# Patient Record
Sex: MALE | Race: BLACK OR AFRICAN AMERICAN | Employment: UNEMPLOYED
[De-identification: names, ages, dates, MRNs, and addresses within clinical notes are randomized per-mention and may not be internally consistent; named-entity substitution may affect disease eponyms.]

---

## 2024-03-18 ENCOUNTER — HOSPITAL ENCOUNTER (EMERGENCY)
Facility: HOSPITAL | Age: 24
Discharge: HOME | End: 2024-03-18
Payer: COMMERCIAL

## 2024-03-18 VITALS
OXYGEN SATURATION: 99 % | WEIGHT: 162 LBS | RESPIRATION RATE: 16 BRPM | TEMPERATURE: 97.8 F | SYSTOLIC BLOOD PRESSURE: 126 MMHG | HEART RATE: 100 BPM | HEIGHT: 74 IN | DIASTOLIC BLOOD PRESSURE: 78 MMHG | BODY MASS INDEX: 20.79 KG/M2

## 2024-03-18 DIAGNOSIS — Z20.2 POSSIBLE EXPOSURE TO STD: ICD-10-CM

## 2024-03-18 DIAGNOSIS — J02.9 ACUTE PHARYNGITIS, UNSPECIFIED ETIOLOGY: Primary | ICD-10-CM

## 2024-03-18 LAB
APPEARANCE UR: CLEAR
BACTERIA URNS QL MICRO: ABNORMAL /HPF
BILIRUB UR QL: NEGATIVE
COLOR UR: ABNORMAL
DEPRECATED S PYO AG THROAT QL EIA: NEGATIVE
EPITH CASTS URNS QL MICRO: ABNORMAL /LPF (ref 0–5)
FLUAV RNA SPEC QL NAA+PROBE: NOT DETECTED
FLUBV RNA SPEC QL NAA+PROBE: NOT DETECTED
GLUCOSE UR STRIP.AUTO-MCNC: NEGATIVE MG/DL
HGB UR QL STRIP: ABNORMAL
KETONES UR QL STRIP.AUTO: 80 MG/DL
LEUKOCYTE ESTERASE UR QL STRIP.AUTO: ABNORMAL
MUCOUS THREADS URNS QL MICRO: ABNORMAL /LPF
NITRITE UR QL STRIP.AUTO: NEGATIVE
PH UR STRIP: 6 (ref 5–8)
PROT UR STRIP-MCNC: 30 MG/DL
RBC #/AREA URNS HPF: ABNORMAL /HPF (ref 0–5)
SARS-COV-2 RNA RESP QL NAA+PROBE: NOT DETECTED
SP GR UR REFRACTOMETRY: 1.02 (ref 1–1.03)
UROBILINOGEN UR QL STRIP.AUTO: 1 EU/DL (ref 0.2–1)
WBC URNS QL MICRO: ABNORMAL /HPF (ref 0–4)

## 2024-03-18 PROCEDURE — 96372 THER/PROPH/DIAG INJ SC/IM: CPT

## 2024-03-18 PROCEDURE — 87880 STREP A ASSAY W/OPTIC: CPT

## 2024-03-18 PROCEDURE — 6360000002 HC RX W HCPCS

## 2024-03-18 PROCEDURE — 81001 URINALYSIS AUTO W/SCOPE: CPT

## 2024-03-18 PROCEDURE — 87491 CHLMYD TRACH DNA AMP PROBE: CPT

## 2024-03-18 PROCEDURE — 87147 CULTURE TYPE IMMUNOLOGIC: CPT

## 2024-03-18 PROCEDURE — 87636 SARSCOV2 & INF A&B AMP PRB: CPT

## 2024-03-18 PROCEDURE — 99284 EMERGENCY DEPT VISIT MOD MDM: CPT

## 2024-03-18 PROCEDURE — 87591 N.GONORRHOEAE DNA AMP PROB: CPT

## 2024-03-18 PROCEDURE — 87070 CULTURE OTHR SPECIMN AEROBIC: CPT

## 2024-03-18 PROCEDURE — 2500000003 HC RX 250 WO HCPCS

## 2024-03-18 RX ORDER — DOXYCYCLINE HYCLATE 100 MG
100 TABLET ORAL 2 TIMES DAILY
Qty: 14 TABLET | Refills: 0 | Status: SHIPPED | OUTPATIENT
Start: 2024-03-18 | End: 2024-03-25

## 2024-03-18 RX ORDER — METRONIDAZOLE 500 MG/1
500 TABLET ORAL 2 TIMES DAILY
Qty: 14 TABLET | Refills: 0 | Status: SHIPPED | OUTPATIENT
Start: 2024-03-18 | End: 2024-03-25

## 2024-03-18 RX ADMIN — LIDOCAINE HYDROCHLORIDE 500 MG: 10 INJECTION, SOLUTION EPIDURAL; INFILTRATION; INTRACAUDAL; PERINEURAL at 16:46

## 2024-03-18 ASSESSMENT — PAIN - FUNCTIONAL ASSESSMENT: PAIN_FUNCTIONAL_ASSESSMENT: NONE - DENIES PAIN

## 2024-03-18 NOTE — ED PROVIDER NOTES
EMERGENCY DEPARTMENT HISTORY AND PHYSICAL EXAM      Patient Name: Radha Torres  MRN: [id number]  YOB: 2000  Provider: Joy Wallace PA-C  PCP: No primary care provider on file.   Time/Date of evaluation: 4:27 PM EDT on 3/18/24    History of Presenting Illness     Chief Complaint   Patient presents with    Otalgia    Pharyngitis    Exposure to STD    Penile Discharge       History Provided By: Patient     History (Narative):   Radha Torres is a 23 y.o. male with no contributory PMHx who presents to the emergency department  by POV C/O multiple complaints.    Since initial complaint is penile discharge for the past 2 weeks, unsure if he has been exposed to any STDs denies any dysuria or hematuria.  Wants to be tested and treated for STDs at this time.    Patient also states that he has been having throat pain that radiates to his left ear for the past 2 days.  He denies any drooling, voice change, he denies any fever, chills, sick contacts.      Past History     Past Medical History:  No past medical history on file.    Past Surgical History:  No past surgical history on file.    Family History:  No family history on file.    Social History:       Medications:  No current facility-administered medications for this encounter.     Current Outpatient Medications   Medication Sig Dispense Refill    doxycycline hyclate (VIBRA-TABS) 100 MG tablet Take 1 tablet by mouth 2 times daily for 7 days 14 tablet 0    metroNIDAZOLE (FLAGYL) 500 MG tablet Take 1 tablet by mouth 2 times daily for 7 days 14 tablet 0       Allergies:  No Known Allergies    Social Determinants of Health:  Social Determinants of Health     Tobacco Use: Not on file   Alcohol Use: Not on file   Financial Resource Strain: Not on file   Food Insecurity: Not on file   Transportation Needs: Not on file   Physical Activity: Not on file   Stress: Not on file   Social Connections: Not on file   Intimate Partner Violence: Not on file

## 2024-03-18 NOTE — DISCHARGE INSTRUCTIONS
You were treated for exposure to a possible STD.  No sexual activity for the next 2 weeks.   Any sexual partner(s) should be checked for STD's as well. You should follow-up with the [facility] Department for any further testing for STDs, including HIV.    Address: [address] [place] [address], [address], [place], [place]  Telephone: [phone]

## 2024-03-18 NOTE — ED TRIAGE NOTES
PATIENT PRESENTED TO THE EMERGENCY DEPT WITH A COMPLAINT OF EAR AND THROAT PAIN X 3 DAYS AGO  PATIENT ALSO REQUESTING STD CHECK  PATIENT ADMITS TO PENILE DISCHARGE AND ODOR X FEW WEEKS  PATIENT DENIES BURNING ON URINATION      PATIENT ALERT AND ORIENTED X 4, PATIENT BREATHES FREELY ON ROOM AIR IN NIL CARDIOPULMOANRY DISTRESS

## 2024-03-18 NOTE — ED NOTES
PATIENT REVIEWED AND DISCHARGE FOR HOME, DISCHARGE PAPERS GIVEN AND EXPLAINED, ALL QUESTIONS AND CONCERN CLARIFIED. PATIENT LEFT DEPT FOR HOME IN NIL DISTRESS

## 2024-03-19 LAB
C TRACH RRNA SPEC QL NAA+PROBE: NEGATIVE
N GONORRHOEA RRNA SPEC QL NAA+PROBE: NEGATIVE
SPECIMEN SOURCE: NORMAL

## 2024-03-20 LAB
BACTERIA SPEC CULT: ABNORMAL
BACTERIA SPEC CULT: ABNORMAL
SERVICE CMNT-IMP: ABNORMAL